# Patient Record
Sex: MALE | Race: WHITE | HISPANIC OR LATINO | Employment: OTHER | URBAN - METROPOLITAN AREA
[De-identification: names, ages, dates, MRNs, and addresses within clinical notes are randomized per-mention and may not be internally consistent; named-entity substitution may affect disease eponyms.]

---

## 2017-01-18 ENCOUNTER — ALLSCRIPTS OFFICE VISIT (OUTPATIENT)
Dept: OTHER | Facility: OTHER | Age: 42
End: 2017-01-18

## 2017-01-18 DIAGNOSIS — K75.4 AUTOIMMUNE HEPATITIS (HCC): ICD-10-CM

## 2017-01-18 DIAGNOSIS — R74.8 ABNORMAL LEVELS OF OTHER SERUM ENZYMES: ICD-10-CM

## 2017-01-19 ENCOUNTER — APPOINTMENT (OUTPATIENT)
Dept: LAB | Facility: HOSPITAL | Age: 42
End: 2017-01-19
Attending: INTERNAL MEDICINE

## 2017-01-19 ENCOUNTER — TRANSCRIBE ORDERS (OUTPATIENT)
Dept: LAB | Facility: HOSPITAL | Age: 42
End: 2017-01-19

## 2017-01-19 DIAGNOSIS — R74.8 ABNORMAL LEVELS OF OTHER SERUM ENZYMES: ICD-10-CM

## 2017-01-19 LAB
ALBUMIN SERPL BCP-MCNC: 3.3 G/DL (ref 3.5–5)
ALP SERPL-CCNC: 179 U/L (ref 46–116)
ALT SERPL W P-5'-P-CCNC: 315 U/L (ref 12–78)
AST SERPL W P-5'-P-CCNC: 108 U/L (ref 5–45)
BILIRUB DIRECT SERPL-MCNC: 0.13 MG/DL (ref 0–0.2)
BILIRUB SERPL-MCNC: 0.57 MG/DL (ref 0.2–1)
PROT SERPL-MCNC: 7.7 G/DL (ref 6.4–8.2)

## 2017-01-19 PROCEDURE — 36415 COLL VENOUS BLD VENIPUNCTURE: CPT

## 2017-01-19 PROCEDURE — 80076 HEPATIC FUNCTION PANEL: CPT

## 2017-01-19 PROCEDURE — 82787 IGG 1 2 3 OR 4 EACH: CPT

## 2017-01-19 PROCEDURE — 82784 ASSAY IGA/IGD/IGG/IGM EACH: CPT

## 2017-01-21 LAB
IGG SERPL-MCNC: 1229 MG/DL (ref 700–1600)
IGG1 SER-MCNC: 685 MG/DL (ref 422–1292)
IGG2 SER-MCNC: 335 MG/DL (ref 117–747)
IGG3 SER-MCNC: 138 MG/DL (ref 41–129)
IGG4 SER-MCNC: 21 MG/DL (ref 1–291)

## 2017-02-16 ENCOUNTER — ALLSCRIPTS OFFICE VISIT (OUTPATIENT)
Dept: OTHER | Facility: OTHER | Age: 42
End: 2017-02-16

## 2017-03-16 DIAGNOSIS — K75.4 AUTOIMMUNE HEPATITIS (HCC): ICD-10-CM

## 2017-04-16 DIAGNOSIS — K75.4 AUTOIMMUNE HEPATITIS (HCC): ICD-10-CM

## 2017-04-27 ENCOUNTER — APPOINTMENT (OUTPATIENT)
Dept: LAB | Facility: HOSPITAL | Age: 42
End: 2017-04-27

## 2017-04-27 ENCOUNTER — TRANSCRIBE ORDERS (OUTPATIENT)
Dept: LAB | Facility: HOSPITAL | Age: 42
End: 2017-04-27

## 2017-04-27 DIAGNOSIS — K75.4 AUTOIMMUNE HEPATITIS (HCC): ICD-10-CM

## 2017-04-27 LAB
ALBUMIN SERPL BCP-MCNC: 3.1 G/DL (ref 3.5–5)
ALP SERPL-CCNC: 92 U/L (ref 46–116)
ALT SERPL W P-5'-P-CCNC: 27 U/L (ref 12–78)
ANION GAP SERPL CALCULATED.3IONS-SCNC: 7 MMOL/L (ref 4–13)
AST SERPL W P-5'-P-CCNC: 20 U/L (ref 5–45)
BILIRUB SERPL-MCNC: 0.51 MG/DL (ref 0.2–1)
BUN SERPL-MCNC: 18 MG/DL (ref 5–25)
CALCIUM SERPL-MCNC: 8.8 MG/DL (ref 8.3–10.1)
CHLORIDE SERPL-SCNC: 102 MMOL/L (ref 100–108)
CO2 SERPL-SCNC: 31 MMOL/L (ref 21–32)
CREAT SERPL-MCNC: 0.87 MG/DL (ref 0.6–1.3)
ERYTHROCYTE [DISTWIDTH] IN BLOOD BY AUTOMATED COUNT: 14.3 % (ref 11.6–15.1)
GFR SERPL CREATININE-BSD FRML MDRD: >60 ML/MIN/1.73SQ M
GLUCOSE P FAST SERPL-MCNC: 120 MG/DL (ref 65–99)
HCT VFR BLD AUTO: 43.2 % (ref 36.5–49.3)
HGB BLD-MCNC: 14.4 G/DL (ref 12–17)
MCH RBC QN AUTO: 31.6 PG (ref 26.8–34.3)
MCHC RBC AUTO-ENTMCNC: 33.3 G/DL (ref 31.4–37.4)
MCV RBC AUTO: 95 FL (ref 82–98)
PLATELET # BLD AUTO: 243 THOUSANDS/UL (ref 149–390)
PMV BLD AUTO: 9.7 FL (ref 8.9–12.7)
POTASSIUM SERPL-SCNC: 4.5 MMOL/L (ref 3.5–5.3)
PROT SERPL-MCNC: 7.4 G/DL (ref 6.4–8.2)
RBC # BLD AUTO: 4.55 MILLION/UL (ref 3.88–5.62)
SODIUM SERPL-SCNC: 140 MMOL/L (ref 136–145)
WBC # BLD AUTO: 12.87 THOUSAND/UL (ref 4.31–10.16)

## 2017-04-27 PROCEDURE — 85027 COMPLETE CBC AUTOMATED: CPT

## 2017-04-27 PROCEDURE — 80053 COMPREHEN METABOLIC PANEL: CPT

## 2017-05-18 ENCOUNTER — ALLSCRIPTS OFFICE VISIT (OUTPATIENT)
Dept: OTHER | Facility: OTHER | Age: 42
End: 2017-05-18

## 2017-05-18 DIAGNOSIS — Z79.52 LONG TERM CURRENT USE OF SYSTEMIC STEROIDS: ICD-10-CM

## 2017-05-18 DIAGNOSIS — K75.4 AUTOIMMUNE HEPATITIS (HCC): ICD-10-CM

## 2017-05-18 DIAGNOSIS — M54.9 DORSALGIA: ICD-10-CM

## 2017-06-02 ENCOUNTER — ALLSCRIPTS OFFICE VISIT (OUTPATIENT)
Dept: OTHER | Facility: OTHER | Age: 42
End: 2017-06-02

## 2017-07-05 ENCOUNTER — APPOINTMENT (OUTPATIENT)
Dept: LAB | Facility: HOSPITAL | Age: 42
End: 2017-07-05

## 2017-07-05 ENCOUNTER — TRANSCRIBE ORDERS (OUTPATIENT)
Dept: LAB | Facility: HOSPITAL | Age: 42
End: 2017-07-05

## 2017-07-05 DIAGNOSIS — K75.4 CHRONIC AGGRESSIVE HEPATITIS (HCC): Primary | ICD-10-CM

## 2017-07-05 DIAGNOSIS — K75.4 CHRONIC AGGRESSIVE HEPATITIS (HCC): ICD-10-CM

## 2017-07-05 PROCEDURE — 80053 COMPREHEN METABOLIC PANEL: CPT | Performed by: INTERNAL MEDICINE

## 2017-07-05 PROCEDURE — 85027 COMPLETE CBC AUTOMATED: CPT | Performed by: INTERNAL MEDICINE

## 2017-07-05 PROCEDURE — 36415 COLL VENOUS BLD VENIPUNCTURE: CPT | Performed by: INTERNAL MEDICINE

## 2017-07-06 ENCOUNTER — ALLSCRIPTS OFFICE VISIT (OUTPATIENT)
Dept: OTHER | Facility: OTHER | Age: 42
End: 2017-07-06

## 2017-07-06 ENCOUNTER — TRANSCRIBE ORDERS (OUTPATIENT)
Dept: LAB | Facility: HOSPITAL | Age: 42
End: 2017-07-06

## 2017-07-06 ENCOUNTER — APPOINTMENT (OUTPATIENT)
Dept: LAB | Facility: HOSPITAL | Age: 42
End: 2017-07-06

## 2017-07-06 DIAGNOSIS — K75.4 AUTOIMMUNE HEPATITIS (HCC): ICD-10-CM

## 2017-07-06 LAB
ALBUMIN SERPL BCP-MCNC: 3.4 G/DL (ref 3.5–5)
ALP SERPL-CCNC: 138 U/L (ref 46–116)
ALT SERPL W P-5'-P-CCNC: 54 U/L (ref 12–78)
AST SERPL W P-5'-P-CCNC: 60 U/L (ref 5–45)
BILIRUB DIRECT SERPL-MCNC: 0.16 MG/DL (ref 0–0.2)
BILIRUB SERPL-MCNC: 0.62 MG/DL (ref 0.2–1)
PROT SERPL-MCNC: 7.8 G/DL (ref 6.4–8.2)

## 2017-07-06 PROCEDURE — 36415 COLL VENOUS BLD VENIPUNCTURE: CPT

## 2017-07-06 PROCEDURE — 80076 HEPATIC FUNCTION PANEL: CPT

## 2017-08-19 ENCOUNTER — GENERIC CONVERSION - ENCOUNTER (OUTPATIENT)
Dept: OTHER | Facility: OTHER | Age: 42
End: 2017-08-19

## 2017-08-21 ENCOUNTER — ALLSCRIPTS OFFICE VISIT (OUTPATIENT)
Dept: OTHER | Facility: OTHER | Age: 42
End: 2017-08-21

## 2017-08-21 DIAGNOSIS — R60.0 LOCALIZED EDEMA: ICD-10-CM

## 2017-08-21 DIAGNOSIS — R42 DIZZINESS AND GIDDINESS: ICD-10-CM

## 2017-08-22 ENCOUNTER — APPOINTMENT (OUTPATIENT)
Dept: LAB | Facility: HOSPITAL | Age: 42
End: 2017-08-22

## 2017-08-22 ENCOUNTER — TRANSCRIBE ORDERS (OUTPATIENT)
Dept: LAB | Facility: HOSPITAL | Age: 42
End: 2017-08-22

## 2017-08-22 DIAGNOSIS — R60.0 LOCALIZED EDEMA: ICD-10-CM

## 2017-08-22 LAB
ALBUMIN SERPL BCP-MCNC: 3.4 G/DL (ref 3.5–5)
ALP SERPL-CCNC: 168 U/L (ref 46–116)
ALT SERPL W P-5'-P-CCNC: 27 U/L (ref 12–78)
ANION GAP SERPL CALCULATED.3IONS-SCNC: 7 MMOL/L (ref 4–13)
AST SERPL W P-5'-P-CCNC: 48 U/L (ref 5–45)
BACTERIA UR QL AUTO: ABNORMAL /HPF
BILIRUB SERPL-MCNC: 0.99 MG/DL (ref 0.2–1)
BILIRUB UR QL STRIP: ABNORMAL
BUN SERPL-MCNC: 10 MG/DL (ref 5–25)
CALCIUM SERPL-MCNC: 9.4 MG/DL (ref 8.3–10.1)
CHLORIDE SERPL-SCNC: 105 MMOL/L (ref 100–108)
CLARITY UR: CLEAR
CO2 SERPL-SCNC: 29 MMOL/L (ref 21–32)
COLOR UR: ABNORMAL
CREAT SERPL-MCNC: 0.82 MG/DL (ref 0.6–1.3)
GFR SERPL CREATININE-BSD FRML MDRD: 111 ML/MIN/1.73SQ M
GLUCOSE P FAST SERPL-MCNC: 73 MG/DL (ref 65–99)
GLUCOSE UR STRIP-MCNC: NEGATIVE MG/DL
HGB UR QL STRIP.AUTO: NEGATIVE
HYALINE CASTS #/AREA URNS LPF: ABNORMAL /LPF
KETONES UR STRIP-MCNC: NEGATIVE MG/DL
LEUKOCYTE ESTERASE UR QL STRIP: ABNORMAL
NITRITE UR QL STRIP: NEGATIVE
NON-SQ EPI CELLS URNS QL MICRO: ABNORMAL /HPF
PH UR STRIP.AUTO: 6 [PH] (ref 4.5–8)
POTASSIUM SERPL-SCNC: 3.7 MMOL/L (ref 3.5–5.3)
PROT SERPL-MCNC: 7.8 G/DL (ref 6.4–8.2)
PROT UR STRIP-MCNC: NEGATIVE MG/DL
RBC #/AREA URNS AUTO: ABNORMAL /HPF
SODIUM SERPL-SCNC: 141 MMOL/L (ref 136–145)
SP GR UR STRIP.AUTO: 1.03 (ref 1–1.03)
UROBILINOGEN UR QL STRIP.AUTO: 1 E.U./DL
WBC #/AREA URNS AUTO: ABNORMAL /HPF

## 2017-08-22 PROCEDURE — 80053 COMPREHEN METABOLIC PANEL: CPT

## 2017-08-22 PROCEDURE — 36415 COLL VENOUS BLD VENIPUNCTURE: CPT

## 2017-08-22 PROCEDURE — 81001 URINALYSIS AUTO W/SCOPE: CPT

## 2017-08-28 ENCOUNTER — GENERIC CONVERSION - ENCOUNTER (OUTPATIENT)
Dept: OTHER | Facility: OTHER | Age: 42
End: 2017-08-28

## 2017-08-29 ENCOUNTER — APPOINTMENT (OUTPATIENT)
Dept: LAB | Facility: HOSPITAL | Age: 42
End: 2017-08-29

## 2017-08-29 ENCOUNTER — TRANSCRIBE ORDERS (OUTPATIENT)
Dept: LAB | Facility: HOSPITAL | Age: 42
End: 2017-08-29

## 2017-08-29 DIAGNOSIS — R42 DIZZINESS AND GIDDINESS: ICD-10-CM

## 2017-08-29 LAB — CORTIS AM PEAK SERPL-MCNC: 18.6 UG/DL (ref 4.2–22.4)

## 2017-08-29 PROCEDURE — 36415 COLL VENOUS BLD VENIPUNCTURE: CPT

## 2017-08-29 PROCEDURE — 82533 TOTAL CORTISOL: CPT

## 2017-11-16 ENCOUNTER — ALLSCRIPTS OFFICE VISIT (OUTPATIENT)
Dept: OTHER | Facility: OTHER | Age: 42
End: 2017-11-16

## 2017-11-16 DIAGNOSIS — K75.4 AUTOIMMUNE HEPATITIS (HCC): ICD-10-CM

## 2018-01-11 NOTE — MISCELLANEOUS
Message  Called and spoke to Mr Jose regarding his lab results, which were normal  He states that he is having increasing postural dizziness and orthostasis getting worse through the last 4-5 days  He almost 'passed out' at work  Of note, his glucose was in 70's on CMP  His complaint of leg swelling is very stable  WIll order morning cortisol given abrupt steroid taper as well as duplex of lower extremities to investigate edema  Plan  Bilateral lower extremity edema    · VAS LOWER LIMB ARTERIAL DUPLEX, COMPLETE BILATERAL/GRAFTS;  SIDE:Bilateral; Status:Hold For - Scheduling; Requested for:[id number];   Postural dizziness    · (1) CORTISOL AM SPECIMEN; Status:Active;  Requested for:[id number];     Signatures   Electronically signed by : ROSEY Newman ; Aug 28 2017  4:38PM EST                       (Author)

## 2018-01-11 NOTE — RESULT NOTES
Verified Results  (1) HEP A AB,TOTAL [id number] 11:38AM [facility] Number: [id number]  TW Order Number: [id number]     Test Name Result Flag Reference   Hep A Ab,Total Reactive A Non-reactive     (1) HEP B SURFACE ANTIBODY [id number] 11:38AM [facility] Order Number: [id number]  TW Order Number: [id number]     Test Name Result Flag Reference   HEPATITIS B SURFACE ANTIBODY 87 40 mIU/mL     Protective Immunity: Hep B Surface Antibody >= 10 mIu/ml (Traceable to [facility] Reference Preparation)     (1) QUANTIFERON - TB GOLD [id number] 11:38AM [facility] Order Number: [id number]  TW Order Number: [id number]     Test Name Result Flag Reference   QUANTIFERON TB GOLD      Specimen incubated at [place], [place]    Performed at:  [address] [place] [address]  [id number]  : Jose Nichols MD, Phone:  [phone]   QUANTIFERON TB GOLD Negative  Negative   QUANTIFERON CRITERIA Comment     To be considered positive a specimen should have a TB Ag minus Nil  value greater than or equal to 0 35 IU/mL and in addition the TB Ag  minus Nil value must be greater than or equal to 25% of the Nil  value  There may be insufficient information in these values to  differentiate between some negative and some indeterminate test  values  QUANTIFERON TB AG VALUE 0 08 IU/mL     QUANTIFERON NIL VALUE 0 05 IU/mL     QUANTIFERON MITOGEN VALUE 2 40 IU/mL     QFT TB AG MINUS NIL VALUE 0 03 IU/mL     QFT INTERPRETATION Comment     The QuantiFERON TB Gold (in Tube) assay is intended for use as an aid  in the diagnosis of TB infection  Negative results suggest that there  is no TB infection  In patients with high suspicion of exposure, a  negative test should be repeated  A positive test indicates infection  with Mycobacterium tuberculosis   Among individuals without  tuberculosis infection, a positive test may be due to exposure to  [employer] Jaime, ROSEY [place] or ROSEY cristina  On the Internet, go to  cdc gov/tb for further details  Performed at:  [address]  [id number]  : Kd Diaz MD, Phone:  [phone]     (1) COMPREHENSIVE METABOLIC PANEL [id number] 75:19GE Yael Manzo Order Number: [id number]   Order Number: [id number]     Test Name Result Flag Reference   GLUCOSE,RANDM 72 mg/dL     If the patient is fasting, the ADA then defines impaired fasting glucose as > 100 mg/dL and diabetes as > or equal to 123 mg/dL  SODIUM 140 mmol/L  136-145   POTASSIUM 3 7 mmol/L  3 5-5 3   CHLORIDE 102 mmol/L  100-108   CARBON DIOXIDE 32 mmol/L  21-32   ANION GAP (CALC) 6 mmol/L  4-13   BLOOD UREA NITROGEN 23 mg/dL  5-25   CREATININE 0 93 mg/dL  0 60-1 30   Standardized to IDMS reference method   CALCIUM 8 8 mg/dL  8 3-10 1   BILI, TOTAL 0 56 mg/dL  0 20-1 00   ALK PHOSPHATAS 176 U/L H    ALT (SGPT) 362 U/L H 12-78   AST(SGOT) 78 U/L H 5-45   ALBUMIN 3 5 g/dL  3 5-5 0   TOTAL PROTEIN 7 9 g/dL  6 4-8 2   eGFR Non-African American      >60 0 ml/min/1 73sq [facility] Disease Education Program recommendations are as follows:  GFR calculation is accurate only with a steady state creatinine  Chronic Kidney disease less than 60 ml/min/1 73 sq  meters  Kidney failure less than 15 ml/min/1 73 sq  meters

## 2018-01-12 VITALS
SYSTOLIC BLOOD PRESSURE: 106 MMHG | BODY MASS INDEX: 25.97 KG/M2 | HEIGHT: 60 IN | HEART RATE: 72 BPM | TEMPERATURE: 98.7 F | DIASTOLIC BLOOD PRESSURE: 60 MMHG | WEIGHT: 132.28 LBS

## 2018-01-12 VITALS
HEART RATE: 88 BPM | TEMPERATURE: 98.4 F | SYSTOLIC BLOOD PRESSURE: 102 MMHG | DIASTOLIC BLOOD PRESSURE: 70 MMHG | WEIGHT: 139.77 LBS | BODY MASS INDEX: 27.44 KG/M2 | HEIGHT: 60 IN

## 2018-01-13 VITALS
BODY MASS INDEX: 26.49 KG/M2 | HEART RATE: 92 BPM | SYSTOLIC BLOOD PRESSURE: 98 MMHG | DIASTOLIC BLOOD PRESSURE: 62 MMHG | TEMPERATURE: 98.1 F | WEIGHT: 134.92 LBS | HEIGHT: 60 IN

## 2018-01-13 VITALS
HEART RATE: 72 BPM | DIASTOLIC BLOOD PRESSURE: 72 MMHG | SYSTOLIC BLOOD PRESSURE: 100 MMHG | WEIGHT: 145.06 LBS | TEMPERATURE: 98 F | HEIGHT: 60 IN | BODY MASS INDEX: 28.48 KG/M2

## 2018-01-13 NOTE — PROGRESS NOTES
Recieved call from patient with complaints of bilateral lower extremity edema (mostly feet though does extend to calf) for the past couple of days  Patient states edema is worse at night and usually resolves in the morning  Not  associated with any erythema or pain  No SOB or CP reported  No anasarce or increasing abdominal distension  Patient recently tapered off steroids, sometimes misses Azathioprine doses for AIH diagnosis  Reports very poor appetite since stopping prednisione,  states has not had a lot of fluid intake  Patient advised to call clinic in the morning on Monday to make same day appointment to be seen  No furosemide was given as do not know patient's renal function, expecially if fluid intake is down  Avised patient  at this time to try to keep feet elevated as it seems that this has a dependent component as resolves overnight when sleeping  Patient advised to come to ED to be evaluated if develops SOB, CP, increased swelling/unilateral swelling, lower extremity  pain, erythema or fever  Electronically signed Iker VERMA    Aug 19 2017  8:34AM EST Author

## 2018-01-14 VITALS
HEART RATE: 80 BPM | SYSTOLIC BLOOD PRESSURE: 112 MMHG | BODY MASS INDEX: 28.22 KG/M2 | WEIGHT: 143.74 LBS | HEIGHT: 60 IN | TEMPERATURE: 98.4 F | DIASTOLIC BLOOD PRESSURE: 72 MMHG

## 2018-01-14 VITALS
WEIGHT: 145.94 LBS | BODY MASS INDEX: 28.65 KG/M2 | HEIGHT: 60 IN | SYSTOLIC BLOOD PRESSURE: 100 MMHG | HEART RATE: 96 BPM | DIASTOLIC BLOOD PRESSURE: 74 MMHG | TEMPERATURE: 98.3 F

## 2018-01-17 NOTE — RESULT NOTES
Message   Please inform patient that his liver tests have improved  His autoimmune studies are still negative that he should continue prednisone therapy as discussed with Dr Gabriel Alaniz in the clinic in follow-up in the clinic within one month  Verified Results  (1) HEPATIC FUNCTION PANEL 05Oct2016 12:11PM Gage Kline Order Number: [id number]     Test Name Result Flag Reference   ALBUMIN 3 3 g/dL L 3 5-5 0   - Patient Instructions: This is a non fasting blood test  Please drink two glasses of water the morning of test     - Patient Instructions: This is a non fasting blood test  Please drink two glasses of water the morning of test    ALK PHOSPHATAS 229 U/L H    ALT (SGPT) 389 U/L H 12-78   AST(SGOT) 125 U/L H 5-45   BILI, DIRECT 0 12 mg/dL  0 00-0 20   BILI, TOTAL 0 35 mg/dL  0 20-1 00   TOTAL PROTEIN 7 8 g/dL  6 4-8 2     (1) LIVER KIDNEY MICROSOMAL ANTIBODY 05Oct2016 12:11PM Cyrilla Coffeeville   TW Order Number: [id number]     Test Name Result Flag Reference   KEVIN/KID MICR AB 1 0 Units  0 0 - 20 0   Negative    0 0 - 20 0                                  Equivocal  20 1 - 24 9                                  Positive         >24 9  LKM type 1 antibodies are detected in patients with  autoimmune hepatitis type 2 and in up to 8% of  patients with chronic HCV infection  Performed at:  [address]  [id number]  : Gregorio Shoemaker MD, Phone:  [phone]     (1) NEUTROPHIL CYTOPLASMIN ANTIBODY [id number] 12:11PM Cyrilla Coffeeville     Test Name Result Flag Reference   CYTOPLAS-C ANCA <1:20 titer  Neg:<1:20   ATYPICAL ANCA <1:20 titer  Neg:<1:20   The atypical pANCA pattern has been observed in a significant  percentage of patients with ulcerative colitis, primary sclerosing  cholangitis and autoimmune hepatitis     MPO ANTIBODY <9 0 U/mL  0 0 - 9 0   AZ-3 ANTIBODY <3 5 U/mL  0 0 - 3 5   P-ANCA <1:20 titer  Neg:<1:20   The presence of positive fluorescence exhibiting P-ANCA or C-ANCA  patterns alone is not specific for the diagnosis of Wegener's  Granulomatosis (WG) or microscopic polyangiitis  Decisions about  treatment should not be based solely on ANCA IFA results  The  International ANCA Group Consensus recommends follow up testing of  positive sera with both DE-3 and MPO-ANCA enzyme immunoassays  As  many as 5% serum samples are positive only by EIA  Ref  AM J Clin Pathol 1999;111:507-513    Performed at:  [address]  [id number]  : Uri Lai MD, Phone:  [phone]

## 2018-01-24 ENCOUNTER — OFFICE VISIT (OUTPATIENT)
Dept: INTERNAL MEDICINE CLINIC | Facility: CLINIC | Age: 43
End: 2018-01-24

## 2018-01-24 VITALS
SYSTOLIC BLOOD PRESSURE: 118 MMHG | HEIGHT: 60 IN | BODY MASS INDEX: 23.37 KG/M2 | DIASTOLIC BLOOD PRESSURE: 62 MMHG | WEIGHT: 119.05 LBS | HEART RATE: 68 BPM | TEMPERATURE: 98.2 F

## 2018-01-24 DIAGNOSIS — Z23 NEED FOR PROPHYLACTIC VACCINATION AND INOCULATION AGAINST INFLUENZA: ICD-10-CM

## 2018-01-24 DIAGNOSIS — M79.675 PAIN OF TOE OF LEFT FOOT: Primary | ICD-10-CM

## 2018-01-24 PROCEDURE — 90656 IIV3 VACC NO PRSV 0.5 ML IM: CPT | Performed by: STUDENT IN AN ORGANIZED HEALTH CARE EDUCATION/TRAINING PROGRAM

## 2018-01-24 PROCEDURE — 99213 OFFICE O/P EST LOW 20 MIN: CPT | Performed by: STUDENT IN AN ORGANIZED HEALTH CARE EDUCATION/TRAINING PROGRAM

## 2018-01-24 PROCEDURE — 90471 IMMUNIZATION ADMIN: CPT | Performed by: STUDENT IN AN ORGANIZED HEALTH CARE EDUCATION/TRAINING PROGRAM

## 2018-01-24 RX ORDER — CYCLOBENZAPRINE HCL 10 MG
TABLET ORAL
COMMUNITY
Start: 2017-06-02 | End: 2018-01-24 | Stop reason: ALTCHOICE

## 2018-01-24 RX ORDER — AZATHIOPRINE 50 MG/1
1 TABLET ORAL DAILY
COMMUNITY
Start: 2017-02-16 | End: 2018-07-19 | Stop reason: SDUPTHER

## 2018-01-24 RX ORDER — BACITRACIN, NEOMYCIN, POLYMYXIN B 400; 3.5; 5 [USP'U]/G; MG/G; [USP'U]/G
OINTMENT TOPICAL 2 TIMES DAILY
Qty: 15 G | Refills: 0 | Status: SHIPPED | OUTPATIENT
Start: 2018-01-24

## 2018-01-24 RX ORDER — B-COMPLEX WITH VITAMIN C
TABLET ORAL
COMMUNITY

## 2018-01-24 RX ORDER — DIPHENOXYLATE HYDROCHLORIDE AND ATROPINE SULFATE 2.5; .025 MG/1; MG/1
1 TABLET ORAL
COMMUNITY

## 2018-01-24 NOTE — PROGRESS NOTES
ASSESSMENT/PLAN:    1  Pain of toe of left foot  · Possible superficial infection secondary to minor trauma from clipping ingrown toe nail  · Advised local wound care; will prescribe Bacitracin ointment to be applied daily and to keep the toe clean and dry  · Will place order to podiatry referral but patient is reluctant to see podiatry due to lack of insurance; advised him to keep the referral slip and if toe worsens, to schedule appointment with them    2  Need for prophylactic vaccination and inoculation against influenza  · Will administer today      Health Maintenance: Will administer flu vaccine today  CHIEF COMPLAINT: Pain of left first toe    HISTORY OF PRESENT ILLNESS:  38year old male presents to office with acute complaint of pain of the left first toe  Patient states that a couple of weeks ago, he believes he had an ingrown toe nail that his wife clipped for him  Since that time, that portion of his toe has become inflamed and very painful  He states he noticed some purulent drainage  He has been taking Tylenol and Naproxen intermittently for the pain but he is weary of taking those medications due to a history of autoimmune hepatitis and pain in the RUQ  He follows with GI for this and was previously on azathioprine and prednisone  He has not been taking this medications for a couple of months due to feeling nauseated from those meds  He states he tried applying some antibiotic ointment that he had at home but did not seem to help  He has been doing salt baths each evening which seems to help it somewhat  Review of Systems   Musculoskeletal:        Left toe pain, swelling   All other systems reviewed and are negative        OBJECTIVE:  Vitals:    01/24/18 1529   BP: 118/62   BP Location: Left arm   Patient Position: Sitting   Cuff Size: Adult   Pulse: 68   Temp: 98 2 °F (36 8 °C)   TempSrc: Oral   Weight: 54 kg (119 lb 0 8 oz)   Height: 4' 9" (1 448 m)     Physical Exam   Constitutional: He is oriented to person, place, and time  He appears well-developed and well-nourished  No distress  HENT:   Head: Normocephalic and atraumatic  Nose: Nose normal    Mouth/Throat: Oropharynx is clear and moist    Eyes: EOM are normal  No scleral icterus  Neck: Neck supple  No JVD present  No tracheal deviation present  Cardiovascular: Normal rate, regular rhythm, normal heart sounds and intact distal pulses  Pulmonary/Chest: Effort normal and breath sounds normal  No respiratory distress  He has no wheezes  He has no rales  Abdominal: Soft  Bowel sounds are normal  He exhibits no distension  There is no tenderness  There is no rebound and no guarding  Neurological: He is alert and oriented to person, place, and time  No cranial nerve deficit  Skin: He is not diaphoretic  Medial aspect of nail in left 1st toe with some dried blood, minimal purulence, unable to express any additional purulence on palpation, erythematous and exquisitely tender to palpation   Psychiatric: He has a normal mood and affect  His behavior is normal  Judgment and thought content normal    Nursing note and vitals reviewed  Current Outpatient Prescriptions:     methadone (DOLOPHINE) 10 mg tablet, daily  Indications: Unsure of dose, , Disp: , Rfl:     multivitamin (THERAGRAN) TABS, Take 1 tablet by mouth, Disp: , Rfl:     naproxen (NAPROSYN) 500 mg tablet, Take 1 tablet (500 mg total) by mouth 2 (two) times a day with meals  , Disp: 20 tablet, Rfl: 0    VITAMIN B COMPLEX-C CAPS, Take by mouth, Disp: , Rfl:     azaTHIOprine (IMURAN) 50 mg tablet, Take 1 tablet by mouth daily, Disp: , Rfl:     Past Medical History:   Diagnosis Date    Spontaneous pneumothorax      Past Surgical History:   Procedure Laterality Date    HERNIA REPAIR       Social History     Social History    Marital status: /Civil Union     Spouse name: N/A    Number of children: N/A    Years of education: N/A     Occupational History    Not on file      Social History Main Topics    Smoking status: Former Smoker    Smokeless tobacco: Not on file    Alcohol use No    Drug use: No    Sexual activity: Yes     Partners: Female     Birth control/ protection: Condom Male     Other Topics Concern    Not on file     Social History Narrative    No narrative on file     No family history on file     ==  DO Yosvany Dee [address] Internal Medicine PGY-1    [facility]  [address]   [facility] , [address] [address] [facility] [address] [facility]  Office: [phone]  Fax: [phone]

## 2018-01-24 NOTE — PATIENT INSTRUCTIONS
Ingrown Nail   WHAT YOU SHOULD KNOW:   An ingrown nail is when the edge of your fingernail or toenail grows into the skin next to it  AFTER YOU LEAVE:   Medicines:   · Acetaminophen: This medicine decreases pain  You can buy acetaminophen without a doctor's order  Ask how much to take and how often to take it  Follow directions  Acetaminophen can cause liver damage if not taken correctly  · NSAIDs  help decrease swelling and pain or fever  This medicine is available with or without a doctor's order  NSAIDs can cause stomach bleeding or kidney problems in certain people  If you take blood thinner medicine, always ask if NSAIDs are safe for you  Always read the medicine label and follow directions  Do not give these medicines to children under 10months of age without direction from your child's doctor  · Antibiotics: This medicine is given to fight or prevent an infection caused by bacteria  · Take your medicine as directed  Call your healthcare provider if you think your medicine is not helping or if you have side effects  Tell him if you are allergic to any medicine  Keep a list of the medicines, vitamins, and herbs you take  Include the amounts, and when and why you take them  Bring the list or the pill bottles to follow-up visits  Carry your medicine list with you in case of an emergency  Follow up with your primary healthcare provider or podiatrist as directed:  Write down your questions so you remember to ask them during your visits  Self-care:   · Soak and lift your nail:  Soak your ingrown nail in water for 20 minutes, 2 times each day  Then gently lift the edge of the ingrown nail away from the skin  Wedge a small piece of cotton or gauze under the corner of the nail  This may help keep the nail from growing into the skin  · Wear shoes and socks that fit well:  Make sure they are not too tight   You may need to wear a shoe with the toe cut out, such as sandals, until your ingrown toenail heals  Do not wear shoes that have pointed toes or heels that are more than 2 inches high  Do not wear tight hosiery or socks  Wear socks, such as cotton-acrylic blends, that pull moisture away from your feet  · Carefully trim your nails:  Cut your nails straight across  Do not cut them too short  Lightly file the nail corners if you have sharp edges  Do not round your nails  Do not rip or tear off the tips of your nails  This may cause your nail edge to grow into the skin  Use clippers, not nail scissors  · Keep your nails clean and dry:  Wash your hands and feet with soap and water  Pat dry with a clean towel  Dry in between each toe  Do not put lotion between your toes  · Inspect your nails daily:  Look for signs of an ingrown nail  Manage problems early so the nail does not become infected  Contact your primary healthcare provider or podiatrist if:   · You have a fever  · You have pus under the cuticle (the skin around the nail)  · The skin around your nail becomes red, painful, and swollen  · Your ingrown nail is not better in 7 days  · You have questions or concerns about your condition or care  Seek care immediately or call 911 if:   · You have a red streak running up your leg or arm  © 2014 [employer] is for End User's use only and may not be sold, redistributed or otherwise used for commercial purposes  All illustrations and images included in CareNotes® are the copyrighted property of A D A M , Inc  or Prosper Willson  The above information is an  only  It is not intended as medical advice for individual conditions or treatments  Talk to your doctor, nurse or pharmacist before following any medical regimen to see if it is safe and effective for you

## 2018-02-06 ENCOUNTER — OFFICE VISIT (OUTPATIENT)
Dept: MULTI SPECIALTY CLINIC | Facility: CLINIC | Age: 43
End: 2018-02-06

## 2018-02-06 VITALS
SYSTOLIC BLOOD PRESSURE: 110 MMHG | WEIGHT: 119.05 LBS | TEMPERATURE: 98.2 F | DIASTOLIC BLOOD PRESSURE: 64 MMHG | BODY MASS INDEX: 23.37 KG/M2 | HEART RATE: 72 BPM | HEIGHT: 60 IN

## 2018-02-06 DIAGNOSIS — M79.675 PAIN OF TOE OF LEFT FOOT: ICD-10-CM

## 2018-02-06 PROCEDURE — 99203 OFFICE O/P NEW LOW 30 MIN: CPT | Performed by: PODIATRIST

## 2018-02-06 NOTE — PROGRESS NOTES
[address] [facility]  38 y o  male MRN: [id number]  Encounter: [id number]    Assessment/Plan     Assessment:  3  39 y/o male with pain in left hallux with hx of medial border ingrown toenail    Plan:  - Patient was seen/examined  All questions and concerns addressed  - medial border left hallux toenail border previously removed by pt wife and does not appear to be clinically infected   -advised pt to do twice daily epsom salt in warm water soaks and to apply triple antibiotic for one more week   -advised pt to call and come in if becomes painful or red or infected  - RTC in 3 months      History of Present Illness     HPI:  Angela Rodas  is a 38 y o  male who presents with hx of ingrown toenail to left medial border great toe  He states it is not painful but was painful and red about one month ago  He states his wife trimmed the nail out and he has been applying triple abx cream  The patient denies any nausea, vomiting, fever, chills, shortness of breath, or chest pains  Consults  Review of Systems   Constitutional: Negative  HENT: Negative  Eyes: Negative  Respiratory: Negative  Cardiovascular: Negative  Gastrointestinal: Negative  Musculoskeletal: Negative   Skin: no signs of infection left hallux  Neurological: Negative          Historical Information   Past Medical History:   Diagnosis Date    Anxiety disorder due to general medical condition with panic attack     Calcaneus fracture     Growth hormone deficiency ([address] )     Pituitary Dwarfism    Spontaneous      Spontaneous pneumothorax     Wrist fracture      Past Surgical History:   Procedure Laterality Date    CHOLECYSTECTOMY      HERNIA REPAIR      INGUINAL HERNIA REPAIR Bilateral     LAPAROSCOPIC INGUINAL HERNIA REPAIR      recurrent     LUNG REMOVAL, PARTIAL      TIBIA FRACTURE SURGERY      WRIST FRACTURE SURGERY       Social History   History   Alcohol Use No     History   Drug Use No History   Smoking Status    Former Smoker   Smokeless Tobacco    Not on file     Family History:   Family History   Problem Relation Age of Onset   Michaela Paz HIV Mother     No Known Problems Family        Meds/Allergies     (Not in a hospital admission)  No Known Allergies    Objective     Current Vitals:   Blood Pressure: 110/64 (02/06/18 1042)  Pulse: 72 (02/06/18 1042)  Temperature: 98 2 °F (36 8 °C) (02/06/18 1042)  Temp Source: Oral (02/06/18 1042)  Height: 4' 9" (144 8 cm) (02/06/18 1042)  Weight - Scale: 54 kg (119 lb 0 8 oz) (02/06/18 1042)        /64 (BP Location: Right arm, Patient Position: Sitting, Cuff Size: Adult)   Pulse 72   Temp 98 2 °F (36 8 °C) (Oral)   Ht 4' 9" (1 448 m)   Wt 54 kg (119 lb 0 8 oz)   BMI 25 76 kg/m²     General Appearance:    Alert, cooperative, no distress   Head:    Normocephalic, without obvious abnormality, atraumatic   Eyes:    PERRL, conjunctiva/corneas clear, EOM's intact            Nose:   Moist mucous membranes, no drainage or sinus tenderness   Throat:   No tenderness, no exudates   Neck:   Supple, symmetrical, trachea midline, no JVD   Back:     Symmetric, no CVA tenderness   Lungs:     Clear to auscultation bilaterally, respirations unlabored   Chest wall:    No tenderness or deformity   Heart:    Regular rate and rhythm, S1 and S2 normal, no murmur, rub   or gallop   Abdomen:     Soft, non-tender, bowel sounds active all four quadrants,     no masses, no organomegaly           Extremities:   MMT is 5/5 to all compartments of the LE, +0/4 edema B/L, Digital ROM is intact,    Pulses:   R DP is +2/4, R PT is +2/4, L DP is +2/4, L PT is +2/4, CFT< 3sec to all digits  Pedal hair is Absent   Skin:   No open Lesions  Skin of the LE is normal texture, turgor   Medial left hallux toenail border with hx of slantback, no clinical signs of infection present, no darryl pus, no ascending cellulitis, no drainage, no redness, no swelling, no POP       Neurologic:   CNII-XII intact  Normal strength, sensation and reflexes       Throughout  Gross sensation is intact   Protective sensation is Intact

## 2018-02-19 ENCOUNTER — OFFICE VISIT (OUTPATIENT)
Dept: MULTI SPECIALTY CLINIC | Facility: CLINIC | Age: 43
End: 2018-02-19

## 2018-02-19 VITALS
BODY MASS INDEX: 23.37 KG/M2 | WEIGHT: 119.05 LBS | TEMPERATURE: 98.5 F | HEART RATE: 68 BPM | DIASTOLIC BLOOD PRESSURE: 60 MMHG | SYSTOLIC BLOOD PRESSURE: 102 MMHG | HEIGHT: 60 IN

## 2018-02-19 DIAGNOSIS — L60.0 INGROWN NAIL: Primary | ICD-10-CM

## 2018-02-19 PROCEDURE — 99213 OFFICE O/P EST LOW 20 MIN: CPT | Performed by: PODIATRIST

## 2018-02-19 NOTE — PROGRESS NOTES
[address]  41 y o  male MRN: [id number]    Assessment/Plan     Assessment:  1  Ingrown toenail, left hallux medial border    Plan:  - Pt seen/examined  - medial border of left hallux trimmed in slantback fashion  - Instructed pt to continue epsom salt soak baths  No drainage/clinical signs of infection/open lesions noted today although pt reports purulent drainage previously  - rtc 3 months, or earlier if new problems arise    History of Present Illness     HPI:  Lara Herrera  is a 41 y o  male who presents with painful ingrown left toenail  Pt complains of the pain for a couple weeks and was here last week right after his wife performed nail avulsion at home  Pt admits to purulent drainage to the area  Pt denies nausea, fever, chills, vomiting, shortness of breath or chest pain  Consults  Review of Systems   Constitutional: Negative  HENT: Negative  Eyes: Negative  Respiratory: Negative  Cardiovascular: Negative  Gastrointestinal: Negative  Musculoskeletal: negative  Skin: ingrowing nail  Neurological: Negative          Historical Information   Past Medical History:   Diagnosis Date    Anxiety disorder due to general medical condition with panic attack     Calcaneus fracture     Growth hormone deficiency ([address] )     Pituitary Dwarfism    Spontaneous      Spontaneous pneumothorax     Wrist fracture      Past Surgical History:   Procedure Laterality Date    CHOLECYSTECTOMY      HERNIA REPAIR      INGUINAL HERNIA REPAIR Bilateral     LAPAROSCOPIC INGUINAL HERNIA REPAIR      recurrent     LUNG REMOVAL, PARTIAL      TIBIA FRACTURE SURGERY      WRIST FRACTURE SURGERY       Social History   History   Alcohol Use No     History   Drug Use No     History   Smoking Status    Former Smoker   Smokeless Tobacco    Not on file     Family History:   Family History   Problem Relation Age of Onset   Migdalia Sesay HIV Mother     No Known Problems Family Meds/Allergies     (Not in a hospital admission)  No Known Allergies    Objective   First Vitals:   @VSFIRST2(5,8,6,7,9,11,14,10:FIRST)@    Current Vitals:   Blood Pressure: 102/60 (02/19/18 1455)  Pulse: 68 (02/19/18 1455)  Temperature: 98 5 °F (36 9 °C) (02/19/18 1455)  Temp Source: Oral (02/19/18 1455)  Height: 4' 9" (144 8 cm) (02/19/18 1455)  Weight - Scale: 54 kg (119 lb 0 8 oz) (02/19/18 1455)        /60 (BP Location: Left arm, Patient Position: Sitting, Cuff Size: Adult)   Pulse 68   Temp 98 5 °F (36 9 °C) (Oral)   Ht 4' 9" (1 448 m)   Wt 54 kg (119 lb 0 8 oz)   BMI 25 76 kg/m²     General Appearance:    Alert, cooperative, no distress   Head:    Normocephalic, without obvious abnormality, atraumatic   Eyes:    PERRL, conjunctiva/corneas clear, EOM's intact        Nose:   Moist mucous membranes   Neck:   Supple, symmetrical, trachea midline   Back:     Symmetric   Lungs:     Respirations unlabored   Abdomen:     Soft, non-tender   Extremities:   MMT is 5/5 to all compartments of the LE, +0/4 edema B/L, Digital ROM is intact, Pain on palpation of hallux nail   Pulses:   R DP is +2/4, R PT is +1/4, L DP is +2/4, L PT is +1/4, CFT< 3sec to all digits   Skin:   Ingrowing nail to medial border of left hallux nail  No active drainage at this time  No maceration in interspaces, no open lesions appreciated  No clinical signs of infection today     Neurologic:    Gross sensation is intact   Protective sensation is intact

## 2018-05-01 ENCOUNTER — TELEPHONE (OUTPATIENT)
Dept: INTERNAL MEDICINE CLINIC | Facility: CLINIC | Age: 43
End: 2018-05-01

## 2018-05-01 NOTE — TELEPHONE ENCOUNTER
Patient stopped taking the steroid GI gave him last July or August  Since December patient has been experiencing stomach pain  Last Sunday he started with diarrhea 2 to 3 times a week  He is also getting really bad headaches  Next available GI appointment is in July  Wife is concerned he has been afraid to come in for an appointment but now he is willing to  Per verbal communication with Wing Cronin and Nai Magdaleno  We will bring him for an appointment this week with his medical doctor  Appointment scheduled for 5/3/18 with Dr Major Teixeira

## 2018-05-03 ENCOUNTER — OFFICE VISIT (OUTPATIENT)
Dept: INTERNAL MEDICINE CLINIC | Facility: CLINIC | Age: 43
End: 2018-05-03

## 2018-05-03 VITALS
BODY MASS INDEX: 22.51 KG/M2 | TEMPERATURE: 98.2 F | HEIGHT: 60 IN | DIASTOLIC BLOOD PRESSURE: 60 MMHG | WEIGHT: 114.64 LBS | SYSTOLIC BLOOD PRESSURE: 110 MMHG | HEART RATE: 76 BPM

## 2018-05-03 DIAGNOSIS — K75.4 AUTOIMMUNE HEPATITIS TREATED WITH STEROIDS (HCC): Primary | ICD-10-CM

## 2018-05-03 PROCEDURE — 99213 OFFICE O/P EST LOW 20 MIN: CPT | Performed by: INTERNAL MEDICINE

## 2018-05-03 NOTE — PROGRESS NOTES
Samia Reddy INTERNAL MEDICINE OFFICE VISIT  [facility]  [address] [place] [address]    NAME: Junior Romero  AGE: 41 y o  SEX: male    DATE OF ENCOUNTER: 5/3/2018    Assessment and Plan     1  Autoimmune hepatitis treated with steroids ([address] )  Stopped immunosuppressants on his own  Has follow-up appointment with GI in 2 months  Will obtain the below studies to evaluate  his hepatic function at this time  Will await these study results before initiating any immunosuppressant  Due to the chronicity of pt's complaints and his known history of liver disease making this the likely cause, will hold off on any treatment until baseline labs  - Comprehensive metabolic panel; Future  - CBC and differential; Future  - US liver; Future      - Counseling Documentation: patient was counseled regarding: instructions for management    Chief Complaint     Chief Complaint   Patient presents with    Abdominal Pain       History of Present Illness     46-year-old male with history of autoimmune hepatitis and primary biliary cirrhosis, who is being followed by GI presents for evaluation of multiple chronic GI complaints including right upper quadrant abdominal pain that is dull in nature and intermittent, since December of 2017  Samia Reddy He was treated in the past with Imuran and prednisone in the past, stated that he did not like the side effects of prednisone therefore opted to taper off  He remained on the Imuran however was instructed to only take with food and he developed worsening nausea and vomiting to where he had decreased oral intake therefore we stop the Imuran  He has been off both immunosuppressants for  months  He has associated chronic symptoms of early satiety, intermittent nausea and vomiting after meal, and fatigue  He has loose stool about 2 to 3 times a day intermittently but otherwise bowel movements have been normal, nonbloody  No hematemesis    States he had weight loss but only because he was taking the prednisone and gained weight, therefore lost this weight when he stopped  No fever, chills, chest pain, cough, oral ulcers  The following portions of the patient's history were reviewed and updated as appropriate: allergies, current medications, past family history, past medical history, past social history, past surgical history and problem list     Review of Systems     Review of Systems   Constitutional: Positive for activity change, appetite change and fatigue  Negative for chills, fever and unexpected weight change  HENT: Negative  Negative for mouth sores  Eyes: Negative for visual disturbance  Respiratory: Negative for cough and chest tightness  Cardiovascular: Negative  Negative for chest pain and leg swelling  Gastrointestinal: Positive for abdominal pain, diarrhea, nausea and vomiting  Negative for abdominal distention, blood in stool and constipation  Genitourinary: Negative  Musculoskeletal: Negative  Negative for gait problem  Skin: Negative for color change and rash  Neurological: Positive for headaches  Active Problem List     Patient Active Problem List   Diagnosis    Opioid dependence (HCC)    ETOH abuse    Elevated LFTs    Hypoglycemia    Nephrolithiasis    Pain of toe of left foot       Objective     /60   Pulse 76   Temp 98 2 °F (36 8 °C)   Ht 4' 9" (1 448 m)   Wt 52 kg (114 lb 10 2 oz)   BMI 24 81 kg/m²     Physical Exam   Constitutional: He is oriented to person, place, and time  No distress  4   foot 9 inches and approximately 114 lb male, appears stated age in no acute distress   HENT:   Head: Normocephalic  Eyes: Conjunctivae are normal  Right eye exhibits no discharge  Left eye exhibits no discharge  No scleral icterus  Neck: Normal range of motion  Neck supple  Cardiovascular: Normal rate, regular rhythm and normal heart sounds  Exam reveals no gallop      No murmur heard   Pulmonary/Chest: Effort normal and breath sounds normal  No respiratory distress  He has no wheezes  He has no rales  Abdominal: Soft  Bowel sounds are normal  He exhibits no distension  There is no tenderness  There is no rebound and no guarding  Well-healed laparoscopic scar right upper quadrant with history of cholecystectomy    The liver is palpated approximately 2 cm below the mid costal border     Musculoskeletal: Normal range of motion  He exhibits no edema or deformity  Neurological: He is alert and oriented to person, place, and time  He exhibits normal muscle tone  Coordination normal    Skin: Skin is warm and dry  No rash noted  He is not diaphoretic  No erythema  There is pallor  No stigmata of liver disease   Psychiatric: He has a normal mood and affect   His behavior is normal        Pertinent Laboratory/Diagnostic Studies:  Chemistry Profile:   Lab Results   Component Value Date/Time     08/22/2017 06:40 AM     06/11/2015 10:37 AM    K 3 7 08/22/2017 06:40 AM    K 3 9 06/11/2015 10:37 AM     08/22/2017 06:40 AM     06/11/2015 10:37 AM    CO2 29 08/22/2017 06:40 AM    CO2 30 06/11/2015 10:37 AM    ANIONGAP 7 08/22/2017 06:40 AM    ANIONGAP 7 06/11/2015 10:37 AM    BUN 10 08/22/2017 06:40 AM    BUN 13 06/11/2015 10:37 AM    CREATININE 0 82 08/22/2017 06:40 AM    CREATININE 0 86 06/11/2015 10:37 AM    GLUCOSE 142 (H) 12/13/2016 09:47 AM    GLUCOSE 90 06/11/2015 10:37 AM    CALCIUM 9 4 08/22/2017 06:40 AM    CALCIUM 9 0 06/11/2015 10:37 AM    MG 2 2 03/10/2014 04:13 AM    AST 48 (H) 08/22/2017 06:40 AM     (H) 06/11/2015 10:37 AM    ALT 27 08/22/2017 06:40 AM     (H) 06/11/2015 10:37 AM    ALKPHOS 168 (H) 08/22/2017 06:40 AM    ALKPHOS 507 (H) 06/11/2015 10:37 AM    PROT 7 8 08/22/2017 06:40 AM    PROT 9 0 (H) 06/11/2015 10:37 AM    BILITOT 0 99 08/22/2017 06:40 AM    BILITOT 0 5 06/11/2015 10:37 AM    EGFR 111 08/22/2017 06:40 AM     Endocrine Studies: Lab Results   Component Value Date/Time    HGBA1C 5 8 12/13/2016 09:47 AM    TRIG 142 06/11/2015 10:37 AM    CHOL 189 06/11/2015 10:37 AM    HDL 40 06/11/2015 10:37 AM    LDLCALC 121 (H) 06/11/2015 10:37 AM         Current Medications     Current Outpatient Prescriptions:     methadone (DOLOPHINE) 10 mg tablet, daily  Indications: Unsure of dose, , Disp: , Rfl:     multivitamin (THERAGRAN) TABS, Take 1 tablet by mouth, Disp: , Rfl:     azaTHIOprine (IMURAN) 50 mg tablet, Take 1 tablet by mouth daily, Disp: , Rfl:     neomycin-bacitracin-polymyxin b (NEOSPORIN) ointment, Apply topically 2 (two) times a day, Disp: 15 g, Rfl: 0    VITAMIN B COMPLEX-C CAPS, Take by mouth, Disp: , Rfl:     Health Maintenance     Health Maintenance   Topic Date Due    HIV SCREENING  1975    Depression Screening PHQ-9  02/09/1989    DTaP,Tdap,and Td Vaccines (1 - Tdap) 02/09/1998    INFLUENZA VACCINE  09/01/2018     Immunization History   Administered Date(s) Administered     Influenza (IM) Preservative Free 01/24/2018    Influenza Quadrivalent, 6-35 Months IM 11/10/2016       Byron Manjarrez DO   5/3/2018 4:27 PM

## 2018-07-18 ENCOUNTER — APPOINTMENT (OUTPATIENT)
Dept: LAB | Facility: HOSPITAL | Age: 43
End: 2018-07-18

## 2018-07-18 ENCOUNTER — TRANSCRIBE ORDERS (OUTPATIENT)
Dept: LAB | Facility: HOSPITAL | Age: 43
End: 2018-07-18

## 2018-07-18 DIAGNOSIS — K75.4 AUTOIMMUNE HEPATITIS TREATED WITH STEROIDS (HCC): ICD-10-CM

## 2018-07-18 LAB
ALBUMIN SERPL BCP-MCNC: 3.5 G/DL (ref 3.5–5)
ALP SERPL-CCNC: 280 U/L (ref 46–116)
ALT SERPL W P-5'-P-CCNC: 100 U/L (ref 12–78)
ANION GAP SERPL CALCULATED.3IONS-SCNC: 3 MMOL/L (ref 4–13)
AST SERPL W P-5'-P-CCNC: 42 U/L (ref 5–45)
BASOPHILS # BLD AUTO: 0.03 THOUSANDS/ΜL (ref 0–0.1)
BASOPHILS NFR BLD AUTO: 1 % (ref 0–1)
BILIRUB SERPL-MCNC: 0.4 MG/DL (ref 0.2–1)
BUN SERPL-MCNC: 13 MG/DL (ref 5–25)
CALCIUM SERPL-MCNC: 8.6 MG/DL (ref 8.3–10.1)
CHLORIDE SERPL-SCNC: 109 MMOL/L (ref 100–108)
CO2 SERPL-SCNC: 27 MMOL/L (ref 21–32)
CREAT SERPL-MCNC: 0.93 MG/DL (ref 0.6–1.3)
EOSINOPHIL # BLD AUTO: 0.09 THOUSAND/ΜL (ref 0–0.61)
EOSINOPHIL NFR BLD AUTO: 1 % (ref 0–6)
ERYTHROCYTE [DISTWIDTH] IN BLOOD BY AUTOMATED COUNT: 12.7 % (ref 11.6–15.1)
GFR SERPL CREATININE-BSD FRML MDRD: 102 ML/MIN/1.73SQ M
GLUCOSE SERPL-MCNC: 81 MG/DL (ref 65–140)
HCT VFR BLD AUTO: 41.7 % (ref 36.5–49.3)
HGB BLD-MCNC: 13.5 G/DL (ref 12–17)
IMM GRANULOCYTES # BLD AUTO: 0.03 THOUSAND/UL (ref 0–0.2)
IMM GRANULOCYTES NFR BLD AUTO: 1 % (ref 0–2)
LYMPHOCYTES # BLD AUTO: 3.29 THOUSANDS/ΜL (ref 0.6–4.47)
LYMPHOCYTES NFR BLD AUTO: 50 % (ref 14–44)
MCH RBC QN AUTO: 28.7 PG (ref 26.8–34.3)
MCHC RBC AUTO-ENTMCNC: 32.4 G/DL (ref 31.4–37.4)
MCV RBC AUTO: 89 FL (ref 82–98)
MONOCYTES # BLD AUTO: 0.42 THOUSAND/ΜL (ref 0.17–1.22)
MONOCYTES NFR BLD AUTO: 7 % (ref 4–12)
NEUTROPHILS # BLD AUTO: 2.57 THOUSANDS/ΜL (ref 1.85–7.62)
NEUTS SEG NFR BLD AUTO: 40 % (ref 43–75)
NRBC BLD AUTO-RTO: 0 /100 WBCS
PLATELET # BLD AUTO: 232 THOUSANDS/UL (ref 149–390)
PMV BLD AUTO: 10.6 FL (ref 8.9–12.7)
POTASSIUM SERPL-SCNC: 3.9 MMOL/L (ref 3.5–5.3)
PROT SERPL-MCNC: 7.9 G/DL (ref 6.4–8.2)
RBC # BLD AUTO: 4.71 MILLION/UL (ref 3.88–5.62)
SODIUM SERPL-SCNC: 139 MMOL/L (ref 136–145)
WBC # BLD AUTO: 6.43 THOUSAND/UL (ref 4.31–10.16)

## 2018-07-18 PROCEDURE — 36415 COLL VENOUS BLD VENIPUNCTURE: CPT

## 2018-07-18 PROCEDURE — 85025 COMPLETE CBC W/AUTO DIFF WBC: CPT

## 2018-07-18 PROCEDURE — 80053 COMPREHEN METABOLIC PANEL: CPT

## 2018-07-19 ENCOUNTER — OFFICE VISIT (OUTPATIENT)
Dept: MULTI SPECIALTY CLINIC | Facility: CLINIC | Age: 43
End: 2018-07-19

## 2018-07-19 VITALS
SYSTOLIC BLOOD PRESSURE: 100 MMHG | BODY MASS INDEX: 21.73 KG/M2 | HEIGHT: 60 IN | TEMPERATURE: 98 F | HEART RATE: 60 BPM | DIASTOLIC BLOOD PRESSURE: 60 MMHG | WEIGHT: 110.67 LBS

## 2018-07-19 DIAGNOSIS — K74.3 PRIMARY BILIARY CIRRHOSIS (HCC): ICD-10-CM

## 2018-07-19 DIAGNOSIS — D59.10 AIHA (AUTOIMMUNE HEMOLYTIC ANEMIA) (HCC): ICD-10-CM

## 2018-07-19 DIAGNOSIS — D59.10 AIHA (AUTOIMMUNE HEMOLYTIC ANEMIA) (HCC): Primary | ICD-10-CM

## 2018-07-19 PROCEDURE — 99213 OFFICE O/P EST LOW 20 MIN: CPT | Performed by: INTERNAL MEDICINE

## 2018-07-19 RX ORDER — URSODIOL 300 MG/1
300 CAPSULE ORAL 2 TIMES DAILY
Qty: 60 CAPSULE | Refills: 2 | Status: SHIPPED | OUTPATIENT
Start: 2018-07-19 | End: 2018-07-19 | Stop reason: SDUPTHER

## 2018-07-19 RX ORDER — AZATHIOPRINE 50 MG/1
50 TABLET ORAL DAILY
Qty: 30 TABLET | Refills: 2 | Status: SHIPPED | OUTPATIENT
Start: 2018-07-19 | End: 2018-07-19 | Stop reason: SDUPTHER

## 2018-07-19 NOTE — PROGRESS NOTES
Assessment/Plan:       Diagnoses and all orders for this visit:    AIHA (autoimmune hemolytic anemia) (HCC)  -     azaTHIOprine (IMURAN) 50 mg tablet; Take 1 tablet (50 mg total) by mouth daily  -     Comprehensive metabolic panel; Standing  -     Comprehensive metabolic panel    Primary biliary cirrhosis (HCC)  -     ursodiol (ACTIGALL) 300 mg capsule; Take 1 capsule (300 mg total) by mouth 2 (two) times a day  -     Comprehensive metabolic panel; Standing  -     Comprehensive metabolic panel        Patient will need to restart Ursodiol and azathioprine  He will undergo CMP monthly and need to follow up in 3 months  Subjective:      Patient ID: Zoltan Leal  is a 41 y o  male  Case of a 57-year-old male with past medical history of alcohol abuse and opioid dependence currently on methadone  Patient was diagnosed with autoimmune hepatitis/primary biliary cirrhosis overlap syndrome for which he was originally treated with oral glucocorticoid  Patient was switched to azathioprine and Ursodiol at his last visit in February  Since his last visit, patient has not been taking his medications in the setting of increased nausea vomiting with all eating and taking pills  Patient states his nausea vomiting was secondary to cessation of glucocorticoid therapy  Patient states he has to the right upper quadrant pain wants or twice a week which lasts about 2-3 seconds  Patient isn't interested in restarting therapy for his autoimmune hepatitis/primary biliary cirrhosis  Patient states he does not drink alcohol, smoke cigarettes or using illicit track this time          The following portions of the patient's history were reviewed and updated as appropriate: allergies, current medications, past family history, past medical history, past social history, past surgical history and problem list     Review of Systems   Constitutional: Negative for appetite change, chills, diaphoresis, fatigue, fever and unexpected weight change  Eyes: Negative for visual disturbance  Respiratory: Negative for cough, chest tightness, shortness of breath and wheezing  Cardiovascular: Negative for chest pain, palpitations and leg swelling  Gastrointestinal: Positive for abdominal pain (RUQ, intermittent)  Negative for abdominal distention, anal bleeding, blood in stool, constipation, diarrhea, nausea, rectal pain and vomiting  Genitourinary: Negative for difficulty urinating, flank pain and urgency  Musculoskeletal: Negative for arthralgias and myalgias  Skin: Negative for pallor and rash  Neurological: Negative for dizziness, weakness, light-headedness and headaches  Objective:      /60 (BP Location: Right arm, Patient Position: Sitting, Cuff Size: Standard)   Pulse 60   Temp 98 °F (36 7 °C) (Oral)   Ht 4' 9" (1 448 m)   Wt 50 2 kg (110 lb 10 7 oz)   BMI 23 95 kg/m²          Physical Exam   Constitutional: He is oriented to person, place, and time  He appears well-nourished  No distress  Short, BMI 24   Eyes: Conjunctivae are normal  No scleral icterus  Neck: Normal range of motion  Neck supple  No JVD present  Cardiovascular: Normal rate and regular rhythm  Exam reveals friction rub  Exam reveals no gallop  No murmur heard  Pulmonary/Chest: Effort normal and breath sounds normal    Abdominal: Soft  Bowel sounds are normal  He exhibits no distension and no mass  There is no tenderness  There is no rebound and no guarding  Musculoskeletal: Normal range of motion  He exhibits no edema  Neurological: He is alert and oriented to person, place, and time  Skin: Skin is warm and dry  No rash noted  He is not diaphoretic  Nursing note and vitals reviewed

## 2018-07-20 RX ORDER — URSODIOL 300 MG/1
CAPSULE ORAL
Qty: 180 CAPSULE | Refills: 2 | Status: SHIPPED | OUTPATIENT
Start: 2018-07-20

## 2018-07-20 RX ORDER — AZATHIOPRINE 50 MG/1
TABLET ORAL
Qty: 90 TABLET | Refills: 2 | Status: SHIPPED | OUTPATIENT
Start: 2018-07-20

## 2019-01-27 NOTE — RESULT NOTES
Message   Please inform patient that his alkaline phosphatase has improved, however his ALT and AST have worsened somewhat  I would like him to continue taking 20 mg of prednisone every day  Please make sure he has enough of this, if not we can prescribe refills  Additionally have ordered some repeat laboratory studies, recommended to have these done in the next one to 2 weeks  Please make sure that he keeps his follow-up appointment in the clinic  Please also follow any change in his symptoms  Verified Results  (1) HEPATIC FUNCTION PANEL [id number] 07:46AM Jagdish Strickland Order Number: [id number]     Test Name Result Flag Reference   ALBUMIN 3 4 g/dL L 3 5-5 0   - Patient Instructions: This is a non fasting blood test  Please drink two glasses of water the morning of test     - Patient Instructions: This is a non fasting blood test  Please drink two glasses of water the morning of test    ALK PHOSPHATAS 280 U/L H    ALT (SGPT) 862 U/L H 12-78   AST(SGOT) 495 U/L H 5-45   BILI, DIRECT 0 35 mg/dL H 0 00-0 20   BILI, TOTAL 0 66 mg/dL  0 20-1 00   TOTAL PROTEIN 7 3 g/dL  6 4-8 2       Plan  Elevated liver enzymes    · (1) HEPATIC FUNCTION PANEL; Status:Active; Requested for:[id number];    · (1) LIVER KIDNEY MICROSOMAL ANTIBODY; Status:Active; Requested for:[id number];    · (LC) ANCA Panel; Status:Active;  Requested for:[id number]; Ears: no ear pain and no hearing problems.Nose: no nasal congestion and no nasal drainage.Mouth/Throat: no dysphagia, no hoarseness and no throat pain.Neck: no lumps, no pain, no stiffness and no swollen glands.

## 2021-01-25 ENCOUNTER — NURSE TRIAGE (OUTPATIENT)
Dept: OTHER | Facility: OTHER | Age: 46
End: 2021-01-25

## 2021-01-25 DIAGNOSIS — Z20.822 EXPOSURE TO COVID-19 VIRUS: Primary | ICD-10-CM

## 2021-01-25 NOTE — TELEPHONE ENCOUNTER
Pt c/o cough, headaches, sweating, and a 3/10 chest pain on the right side  Pt states "I've had about 30 spontaneous pneumothoraces  I've had surgery for them in the past  It doesn't feel like that  I know what they feel like " Pt able to speak in full sentences without difficulty  No distress noted over phone  Pt advised to go to ED per protocol  Pt declines advice at this time and would like to be tested  Pt advised to go to ED if chest pain or symptoms get worse, pt verbalizes understanding  Pt is requesting a covid test and does not have a [facility]'s PCP   Reports having an out of network PCP  Order placed   Pt informed of closest testing site and was advised of hours of operation, address, to wear a mask, and to stay in the car   Pt verbalized understanding       Pt already has a [employer] account to check for results   Advised to begin checking in 2-3 days after testing is completed  Reason for Disposition   Chest pain or pressure    Answer Assessment - Initial Assessment Questions  Were you within 6 feet or less, for up to 15 minutes or more with a person that has a confirmed COVID-19 test? Unknown     What was the date of your exposure?  Unknown    Are you experiencing any symptoms attributed to the virus?  (Assess for SOB, cough, fever, difficulty breathing) headaches, cough, 3/10 chest pain on right side, sweating     HIGH RISK: Do you have any history heart or lung conditions, weakened immune system, diabetes, Asthma, CHF, HIV, COPD, Chemo, renal failure, sickle cell, etc? Spontaneous pneumothorax x about 30 times "I've had surgery for it "    Protocols used: CORONAVIRUS (COVID-19) DIAGNOSED OR SUSPECTED-ADULT-[place]

## 2021-01-25 NOTE — TELEPHONE ENCOUNTER
Regarding: COVID - Symptomatic - Chest Pain, Coughing  ----- Message from Mj Mcdaniels sent at 1/25/2021 11:50 AM EST -----  "I would like to get tested, I've had headaches since yesterday, and woke up this morning the right side of my chest hurts and I'm coughing a little "    NOTE: He does not want his INS info in his chart until he calls his ins company